# Patient Record
Sex: FEMALE | Race: OTHER | HISPANIC OR LATINO
[De-identification: names, ages, dates, MRNs, and addresses within clinical notes are randomized per-mention and may not be internally consistent; named-entity substitution may affect disease eponyms.]

---

## 2018-07-31 ENCOUNTER — APPOINTMENT (OUTPATIENT)
Dept: HUMAN REPRODUCTION | Facility: CLINIC | Age: 28
End: 2018-07-31
Payer: SELF-PAY

## 2018-07-31 DIAGNOSIS — N91.2 AMENORRHEA, UNSPECIFIED: ICD-10-CM

## 2018-07-31 PROCEDURE — 99212 OFFICE O/P EST SF 10 MIN: CPT

## 2018-08-21 ENCOUNTER — APPOINTMENT (OUTPATIENT)
Dept: HUMAN REPRODUCTION | Facility: CLINIC | Age: 28
End: 2018-08-21
Payer: SELF-PAY

## 2018-08-21 PROCEDURE — 99213 OFFICE O/P EST LOW 20 MIN: CPT | Mod: 25

## 2018-08-21 PROCEDURE — 76830 TRANSVAGINAL US NON-OB: CPT

## 2018-08-21 PROCEDURE — 36415 COLL VENOUS BLD VENIPUNCTURE: CPT

## 2018-09-01 ENCOUNTER — APPOINTMENT (OUTPATIENT)
Dept: HUMAN REPRODUCTION | Facility: CLINIC | Age: 28
End: 2018-09-01
Payer: SELF-PAY

## 2018-09-01 PROCEDURE — 36415 COLL VENOUS BLD VENIPUNCTURE: CPT

## 2018-09-01 PROCEDURE — 99211 OFF/OP EST MAY X REQ PHY/QHP: CPT | Mod: 25

## 2018-09-01 PROCEDURE — 76830 TRANSVAGINAL US NON-OB: CPT

## 2018-09-05 ENCOUNTER — APPOINTMENT (OUTPATIENT)
Dept: HUMAN REPRODUCTION | Facility: CLINIC | Age: 28
End: 2018-09-05
Payer: SELF-PAY

## 2018-09-05 PROCEDURE — 76830 TRANSVAGINAL US NON-OB: CPT

## 2018-09-05 PROCEDURE — 99212 OFFICE O/P EST SF 10 MIN: CPT | Mod: 25

## 2018-09-05 PROCEDURE — 36415 COLL VENOUS BLD VENIPUNCTURE: CPT

## 2018-09-11 ENCOUNTER — APPOINTMENT (OUTPATIENT)
Dept: HUMAN REPRODUCTION | Facility: CLINIC | Age: 28
End: 2018-09-11
Payer: SELF-PAY

## 2018-09-11 PROCEDURE — 76830 TRANSVAGINAL US NON-OB: CPT

## 2018-09-11 PROCEDURE — 99213 OFFICE O/P EST LOW 20 MIN: CPT | Mod: 25

## 2018-09-15 ENCOUNTER — APPOINTMENT (OUTPATIENT)
Dept: HUMAN REPRODUCTION | Facility: CLINIC | Age: 28
End: 2018-09-15
Payer: SELF-PAY

## 2018-09-15 PROCEDURE — 99211 OFF/OP EST MAY X REQ PHY/QHP: CPT | Mod: 25

## 2018-09-15 PROCEDURE — 76830 TRANSVAGINAL US NON-OB: CPT

## 2018-09-20 ENCOUNTER — APPOINTMENT (OUTPATIENT)
Dept: HUMAN REPRODUCTION | Facility: CLINIC | Age: 28
End: 2018-09-20
Payer: SELF-PAY

## 2018-09-20 PROCEDURE — 76830 TRANSVAGINAL US NON-OB: CPT

## 2018-09-20 PROCEDURE — 99213 OFFICE O/P EST LOW 20 MIN: CPT | Mod: 25

## 2018-09-20 RX ORDER — MEDROXYPROGESTERONE ACETATE 10 MG/1
10 TABLET ORAL
Qty: 5 | Refills: 0 | Status: ACTIVE | COMMUNITY
Start: 2018-07-31 | End: 1900-01-01

## 2018-10-05 ENCOUNTER — APPOINTMENT (OUTPATIENT)
Dept: HUMAN REPRODUCTION | Facility: CLINIC | Age: 28
End: 2018-10-05
Payer: SELF-PAY

## 2018-10-05 PROCEDURE — 76830 TRANSVAGINAL US NON-OB: CPT

## 2018-10-05 PROCEDURE — 99213 OFFICE O/P EST LOW 20 MIN: CPT | Mod: 25

## 2018-10-05 RX ORDER — LETROZOLE TABLETS 2.5 MG/1
2.5 TABLET, FILM COATED ORAL
Qty: 15 | Refills: 0 | Status: ACTIVE | COMMUNITY
Start: 2018-10-05 | End: 1900-01-01

## 2018-10-16 ENCOUNTER — APPOINTMENT (OUTPATIENT)
Dept: HUMAN REPRODUCTION | Facility: CLINIC | Age: 28
End: 2018-10-16

## 2023-06-26 ENCOUNTER — OUTPATIENT (OUTPATIENT)
Dept: INPATIENT UNIT | Facility: HOSPITAL | Age: 33
LOS: 1 days | End: 2023-06-26
Payer: COMMERCIAL

## 2023-06-26 VITALS
RESPIRATION RATE: 16 BRPM | HEART RATE: 83 BPM | TEMPERATURE: 98 F | DIASTOLIC BLOOD PRESSURE: 73 MMHG | SYSTOLIC BLOOD PRESSURE: 119 MMHG

## 2023-06-26 VITALS — HEART RATE: 81 BPM | SYSTOLIC BLOOD PRESSURE: 102 MMHG | DIASTOLIC BLOOD PRESSURE: 58 MMHG

## 2023-06-26 DIAGNOSIS — Z98.891 HISTORY OF UTERINE SCAR FROM PREVIOUS SURGERY: Chronic | ICD-10-CM

## 2023-06-26 DIAGNOSIS — O26.899 OTHER SPECIFIED PREGNANCY RELATED CONDITIONS, UNSPECIFIED TRIMESTER: ICD-10-CM

## 2023-06-26 DIAGNOSIS — Z98.890 OTHER SPECIFIED POSTPROCEDURAL STATES: Chronic | ICD-10-CM

## 2023-06-26 PROCEDURE — 76818 FETAL BIOPHYS PROFILE W/NST: CPT

## 2023-06-26 PROCEDURE — 99221 1ST HOSP IP/OBS SF/LOW 40: CPT | Mod: 25,GC

## 2023-06-26 PROCEDURE — 59025 FETAL NON-STRESS TEST: CPT

## 2023-06-26 PROCEDURE — G0463: CPT

## 2023-06-26 PROCEDURE — 59025 FETAL NON-STRESS TEST: CPT | Mod: 26

## 2023-06-26 RX ORDER — ACETAMINOPHEN 500 MG
975 TABLET ORAL ONCE
Refills: 0 | Status: COMPLETED | OUTPATIENT
Start: 2023-06-26 | End: 2023-06-26

## 2023-06-26 RX ADMIN — Medication 975 MILLIGRAM(S): at 19:32

## 2023-06-26 NOTE — OB PROVIDER TRIAGE NOTE - ATTENDING COMMENTS
33y  at 34w5d who presents to L&D for decreased fetal movement since yesterday now resolved, headache responsive to Tylenol with no s/s pre-eclampsia. GDM controlled. Movement counts discussed, return precautions.  Impression NST: reactive

## 2023-06-26 NOTE — OB PROVIDER TRIAGE NOTE - NSOBPROVIDERNOTE_OBGYN_ALL_OB_FT
A/P: 32 yo  at 34w5d who presents for decreased fetal movement, now appreciating movement, and headache s/p Tylenol in triage    -[place]: none  -NST: reactive and reassuring  -BPP : cephalic, posterior placenta, ALIDA 8.8, gross fetal movement tone and breathing noted  -Discussed lying left lateral, drinking cold/fizzy drink to appreciate fetal movement. Discussed  labor return precautions    Discussed with and seen by Dr. Chaves A/P: 32 yo  at 34w5d who presents for decreased fetal movement, now appreciating movement, and headache s/p Tylenol in triage    -[place]: none  -NST: reactive and reassuring  -BPP : cephalic, posterior placenta, ALIDA 8.8, gross fetal movement tone and breathing noted. Uploaded to [facility]OB  -Discussed lying left lateral, drinking cold/fizzy drink to appreciate fetal movement. Discussed  labor return precautions    Discussed with and seen by Dr. Chaves

## 2023-06-26 NOTE — OB RN TRIAGE NOTE - NS_GESTAGE_OBGYN_ALL_OB_FT
Patient arrives via wheelchair after taking the bus to the ED for c/o dyspnea, productive cough, and subjective fever at home for the past 15 hours.  
34w5d

## 2023-06-26 NOTE — OB PROVIDER TRIAGE NOTE - NSHPPHYSICALEXAM_GEN_ALL_CORE
T(C): 36.7 (06-26-23 @ 18:00), Max: 36.7 (06-26-23 @ 17:51)  HR: 83 (06-26-23 @ 18:00) (83 - 83)  BP: 119/73 (06-26-23 @ 18:00) (119/73 - 119/73)  RR: 16 (06-26-23 @ 18:00) (16 - 16)  SpO2: --  Gen: NAD, well-appearing  Pulm: Resting comfortably on room air  Abd: Soft, gravid  Ext: Non-edematous, non-tender       FHT: 140/mod jesus/+accels/-decels  [employer]: none

## 2023-06-26 NOTE — OB PROVIDER TRIAGE NOTE - HISTORY OF PRESENT ILLNESS
RAQUEL REYESLOPEZ is a 33y  at 34w5d who presents to L&D for decreased fetal movement since yesterday. She is now appreciating fm in triage while on the monitors. She reports mild headache that she has not taken anything for. She had nausea, vomiting yesterday that has resolved.     She denies vaginal bleeding, contractions and leakage of fluid.   She denies visual disturbances, RUQ pain, epigastric pain and new-onset edema.   She denies dysuria, hematuria. She denies shortness of breath, chest pain, and palpitations.    Pregnancy course is significant for:  GDMA1    POB: G1 (2017) pCS. G2 () rCS.  PGYN: Denies fibroids, denies cysts, denies STD hx, denies abnormal PAPs  PMH: Denies  PSH: C/Sx2  SH: Denies tobacco use, EtOH use and illicit drug use during the pregnancy. Feels safe at home  Meds: PNV  All: NKDA

## 2023-06-26 NOTE — OB RN TRIAGE NOTE - FALL HARM RISK - UNIVERSAL INTERVENTIONS
Bed in lowest position, wheels locked, appropriate side rails in place/Call bell, personal items and telephone in reach/Instruct patient to call for assistance before getting out of bed or chair/Non-slip footwear when patient is out of bed/[place] to call system/Physically safe environment - no spills, clutter or unnecessary equipment/Purposeful Proactive Rounding/Room/bathroom lighting operational, light cord in reach

## 2023-06-27 DIAGNOSIS — O34.219 MATERNAL CARE FOR UNSPECIFIED TYPE SCAR FROM PREVIOUS CESAREAN DELIVERY: ICD-10-CM

## 2023-06-27 DIAGNOSIS — O36.8130 DECREASED FETAL MOVEMENTS, THIRD TRIMESTER, NOT APPLICABLE OR UNSPECIFIED: ICD-10-CM

## 2023-06-27 DIAGNOSIS — O21.2 LATE VOMITING OF PREGNANCY: ICD-10-CM

## 2023-06-27 DIAGNOSIS — R51.9 HEADACHE, UNSPECIFIED: ICD-10-CM

## 2023-06-27 DIAGNOSIS — O24.410 GESTATIONAL DIABETES MELLITUS IN PREGNANCY, DIET CONTROLLED: ICD-10-CM

## 2023-06-27 DIAGNOSIS — O26.893 OTHER SPECIFIED PREGNANCY RELATED CONDITIONS, THIRD TRIMESTER: ICD-10-CM

## 2023-06-27 DIAGNOSIS — Z3A.34 34 WEEKS GESTATION OF PREGNANCY: ICD-10-CM

## 2023-07-12 PROBLEM — O24.410 GESTATIONAL DIABETES MELLITUS IN PREGNANCY, DIET CONTROLLED: Chronic | Status: ACTIVE | Noted: 2023-06-26

## 2023-07-14 DIAGNOSIS — Z3A.37 37 WEEKS GESTATION OF PREGNANCY: ICD-10-CM

## 2023-07-14 DIAGNOSIS — O24.419 GESTATIONAL DIABETES MELLITUS IN PREGNANCY, UNSPECIFIED CONTROL: ICD-10-CM

## 2023-07-14 DIAGNOSIS — E66.9 OBESITY, UNSPECIFIED: ICD-10-CM

## 2023-07-14 DIAGNOSIS — B89 UNSPECIFIED PARASITIC DISEASE: ICD-10-CM

## 2023-07-17 ENCOUNTER — APPOINTMENT (OUTPATIENT)
Dept: ANTEPARTUM | Facility: CLINIC | Age: 33
End: 2023-07-17

## 2023-07-17 ENCOUNTER — APPOINTMENT (OUTPATIENT)
Dept: MATERNAL FETAL MEDICINE | Facility: CLINIC | Age: 33
End: 2023-07-17

## 2023-07-23 RX ORDER — SODIUM CHLORIDE 9 MG/ML
1000 INJECTION, SOLUTION INTRAVENOUS
Refills: 0 | Status: DISCONTINUED | OUTPATIENT
Start: 2023-07-26 | End: 2023-07-26

## 2023-07-23 NOTE — OB PROVIDER H&P - NSHPPHYSICALEXAM_GEN_ALL_CORE
OBJECTIVE:  Physical Exam:  Gen: NAD, well-appearing, AAOx3   Abd: Soft, gravid  Ext: non-tender, non-edematous  SSE:   SVE:    Bedside sono:  FHT:  [place]: OBJECTIVE:  Physical Exam:  Gen: NAD, well-appearing, AAOx3   Abd: Soft, gravid  Ext: non-tender, non-edematous    Bedside sono:  FHT:  [place]:

## 2023-07-23 NOTE — OB PROVIDER H&P - HISTORY OF PRESENT ILLNESS
Patient is a 33 year old  at 39w0d by US who presents to L&D for rCS and BTL.     ENEDINA: 23   LMP: 22 (not consistent with US)     Pregnancy course pertinent for:   Hx CSx2   GDMA1   T. Cruzi positive   GBS: Negative ()     Obhx:     pCS  8lbs. 8oz.   2020 CS   Gynhx: PCOS Hx fallopian tube surgery, denies fibroids, ovarian cysts, abnormal paps   Pmhx: Latent TB infection   Pshx: CSx2, fallopian tube surgery (unspecified)   Meds: PNV   Allergies: NKDA   Social Hx: Denies using cigarettes, EtOH, illicut drug use. Feels safe at home

## 2023-07-23 NOTE — OB PROVIDER H&P - ASSESSMENT
A/P: RAQUEL REYESLOPEZ is a 33y  at 39wk0d here for rCS and BTL.     - Admit to L&D  - Consent  - Admission labs ordered  - IV fluids  - Fetus: Cat I tracing. Continuous toco and fetal monitoring.   - GBS: Negative    Discussed with   A/P: RAQUEL REYESLOPEZ is a 33y  at 39wk0d here for rCS and BTL.     - Admit to L&D for rCS  - Consent  - Admission labs ordered  - IV fluids  - Fetus: Cat I tracing. Continuous toco and fetal monitoring.   - GBS: Negative    Discussed with Dr. Klein

## 2023-07-25 ENCOUNTER — TRANSCRIPTION ENCOUNTER (OUTPATIENT)
Age: 33
End: 2023-07-25

## 2023-07-25 ENCOUNTER — OUTPATIENT (OUTPATIENT)
Dept: OUTPATIENT SERVICES | Facility: HOSPITAL | Age: 33
LOS: 1 days | End: 2023-07-25
Payer: COMMERCIAL

## 2023-07-25 DIAGNOSIS — Z01.812 ENCOUNTER FOR PREPROCEDURAL LABORATORY EXAMINATION: ICD-10-CM

## 2023-07-25 DIAGNOSIS — Z98.890 OTHER SPECIFIED POSTPROCEDURAL STATES: Chronic | ICD-10-CM

## 2023-07-25 DIAGNOSIS — Z98.891 HISTORY OF UTERINE SCAR FROM PREVIOUS SURGERY: Chronic | ICD-10-CM

## 2023-07-25 LAB
BASOPHILS # BLD AUTO: 0.02 K/UL — SIGNIFICANT CHANGE UP (ref 0–0.2)
BASOPHILS NFR BLD AUTO: 0.2 % — SIGNIFICANT CHANGE UP (ref 0–2)
BLD GP AB SCN SERPL QL: SIGNIFICANT CHANGE UP
COMMENT - BLOOD BANK: SIGNIFICANT CHANGE UP
EOSINOPHIL # BLD AUTO: 0.03 K/UL — SIGNIFICANT CHANGE UP (ref 0–0.5)
EOSINOPHIL NFR BLD AUTO: 0.3 % — SIGNIFICANT CHANGE UP (ref 0–6)
HCT VFR BLD CALC: 31.7 % — LOW (ref 34.5–45)
HGB BLD-MCNC: 10.1 G/DL — LOW (ref 11.5–15.5)
IMM GRANULOCYTES NFR BLD AUTO: 0.3 % — SIGNIFICANT CHANGE UP (ref 0–0.9)
LYMPHOCYTES # BLD AUTO: 1.25 K/UL — SIGNIFICANT CHANGE UP (ref 1–3.3)
LYMPHOCYTES # BLD AUTO: 14.3 % — SIGNIFICANT CHANGE UP (ref 13–44)
MCHC RBC-ENTMCNC: 25.1 PG — LOW (ref 27–34)
MCHC RBC-ENTMCNC: 31.9 GM/DL — LOW (ref 32–36)
MCV RBC AUTO: 78.7 FL — LOW (ref 80–100)
MONOCYTES # BLD AUTO: 0.42 K/UL — SIGNIFICANT CHANGE UP (ref 0–0.9)
MONOCYTES NFR BLD AUTO: 4.8 % — SIGNIFICANT CHANGE UP (ref 2–14)
NEUTROPHILS # BLD AUTO: 6.99 K/UL — SIGNIFICANT CHANGE UP (ref 1.8–7.4)
NEUTROPHILS NFR BLD AUTO: 80.1 % — HIGH (ref 43–77)
PLATELET # BLD AUTO: 209 K/UL — SIGNIFICANT CHANGE UP (ref 150–400)
RBC # BLD: 4.03 M/UL — SIGNIFICANT CHANGE UP (ref 3.8–5.2)
RBC # FLD: 16.1 % — HIGH (ref 10.3–14.5)
WBC # BLD: 8.74 K/UL — SIGNIFICANT CHANGE UP (ref 3.8–10.5)
WBC # FLD AUTO: 8.74 K/UL — SIGNIFICANT CHANGE UP (ref 3.8–10.5)

## 2023-07-26 ENCOUNTER — INPATIENT (INPATIENT)
Facility: HOSPITAL | Age: 33
LOS: 1 days | Discharge: HOME | End: 2023-07-28
Attending: OBSTETRICS & GYNECOLOGY | Admitting: OBSTETRICS & GYNECOLOGY
Payer: COMMERCIAL

## 2023-07-26 ENCOUNTER — TRANSCRIPTION ENCOUNTER (OUTPATIENT)
Age: 33
End: 2023-07-26

## 2023-07-26 VITALS
HEIGHT: 59 IN | WEIGHT: 192.02 LBS | SYSTOLIC BLOOD PRESSURE: 109 MMHG | HEART RATE: 84 BPM | DIASTOLIC BLOOD PRESSURE: 63 MMHG | TEMPERATURE: 98 F | RESPIRATION RATE: 18 BRPM

## 2023-07-26 DIAGNOSIS — Z98.890 OTHER SPECIFIED POSTPROCEDURAL STATES: Chronic | ICD-10-CM

## 2023-07-26 DIAGNOSIS — O34.211 MATERNAL CARE FOR LOW TRANSVERSE SCAR FROM PREVIOUS CESAREAN DELIVERY: ICD-10-CM

## 2023-07-26 DIAGNOSIS — Z98.891 HISTORY OF UTERINE SCAR FROM PREVIOUS SURGERY: Chronic | ICD-10-CM

## 2023-07-26 LAB
ALBUMIN SERPL ELPH-MCNC: 3.6 G/DL — SIGNIFICANT CHANGE UP (ref 3.3–5.2)
ALP SERPL-CCNC: 152 U/L — HIGH (ref 40–120)
ALT FLD-CCNC: 8 U/L — SIGNIFICANT CHANGE UP
ANION GAP SERPL CALC-SCNC: 14 MMOL/L — SIGNIFICANT CHANGE UP (ref 5–17)
ANISOCYTOSIS BLD QL: SLIGHT — SIGNIFICANT CHANGE UP
APPEARANCE UR: ABNORMAL
APTT BLD: 24.4 SEC — LOW (ref 24.5–35.6)
AST SERPL-CCNC: 14 U/L — SIGNIFICANT CHANGE UP
BACTERIA # UR AUTO: ABNORMAL
BASOPHILS # BLD AUTO: 0 K/UL — SIGNIFICANT CHANGE UP (ref 0–0.2)
BASOPHILS # BLD AUTO: 0.02 K/UL — SIGNIFICANT CHANGE UP (ref 0–0.2)
BASOPHILS NFR BLD AUTO: 0 % — SIGNIFICANT CHANGE UP (ref 0–2)
BASOPHILS NFR BLD AUTO: 0.3 % — SIGNIFICANT CHANGE UP (ref 0–2)
BILIRUB SERPL-MCNC: 0.4 MG/DL — SIGNIFICANT CHANGE UP (ref 0.4–2)
BILIRUB UR-MCNC: NEGATIVE — SIGNIFICANT CHANGE UP
BUN SERPL-MCNC: 4.8 MG/DL — LOW (ref 8–20)
CALCIUM SERPL-MCNC: 8.7 MG/DL — SIGNIFICANT CHANGE UP (ref 8.4–10.5)
CHLORIDE SERPL-SCNC: 103 MMOL/L — SIGNIFICANT CHANGE UP (ref 96–108)
CO2 SERPL-SCNC: 21 MMOL/L — LOW (ref 22–29)
COLOR SPEC: YELLOW — SIGNIFICANT CHANGE UP
COVID-19 SPIKE DOMAIN AB INTERP: POSITIVE
COVID-19 SPIKE DOMAIN ANTIBODY RESULT: >250 U/ML — HIGH
CREAT ?TM UR-MCNC: 38 MG/DL — SIGNIFICANT CHANGE UP
CREAT SERPL-MCNC: 0.24 MG/DL — LOW (ref 0.5–1.3)
DIFF PNL FLD: NEGATIVE — SIGNIFICANT CHANGE UP
EGFR: 151 ML/MIN/1.73M2 — SIGNIFICANT CHANGE UP
EOSINOPHIL # BLD AUTO: 0 K/UL — SIGNIFICANT CHANGE UP (ref 0–0.5)
EOSINOPHIL # BLD AUTO: 0.04 K/UL — SIGNIFICANT CHANGE UP (ref 0–0.5)
EOSINOPHIL NFR BLD AUTO: 0 % — SIGNIFICANT CHANGE UP (ref 0–6)
EOSINOPHIL NFR BLD AUTO: 0.5 % — SIGNIFICANT CHANGE UP (ref 0–6)
EPI CELLS # UR: ABNORMAL
FIBRINOGEN PPP-MCNC: 419 MG/DL — SIGNIFICANT CHANGE UP (ref 200–450)
GIANT PLATELETS BLD QL SMEAR: PRESENT — SIGNIFICANT CHANGE UP
GLUCOSE BLDC GLUCOMTR-MCNC: 79 MG/DL — SIGNIFICANT CHANGE UP (ref 70–99)
GLUCOSE BLDC GLUCOMTR-MCNC: 93 MG/DL — SIGNIFICANT CHANGE UP (ref 70–99)
GLUCOSE SERPL-MCNC: 73 MG/DL — SIGNIFICANT CHANGE UP (ref 70–99)
GLUCOSE UR QL: NEGATIVE MG/DL — SIGNIFICANT CHANGE UP
HCT VFR BLD CALC: 30 % — LOW (ref 34.5–45)
HCT VFR BLD CALC: 32.2 % — LOW (ref 34.5–45)
HGB BLD-MCNC: 10.5 G/DL — LOW (ref 11.5–15.5)
HGB BLD-MCNC: 9.7 G/DL — LOW (ref 11.5–15.5)
IMM GRANULOCYTES NFR BLD AUTO: 0.3 % — SIGNIFICANT CHANGE UP (ref 0–0.9)
INR BLD: 0.98 RATIO — SIGNIFICANT CHANGE UP (ref 0.85–1.18)
KETONES UR-MCNC: ABNORMAL
LDH SERPL L TO P-CCNC: 157 U/L — SIGNIFICANT CHANGE UP (ref 98–192)
LEUKOCYTE ESTERASE UR-ACNC: ABNORMAL
LYMPHOCYTES # BLD AUTO: 0.63 K/UL — LOW (ref 1–3.3)
LYMPHOCYTES # BLD AUTO: 1.12 K/UL — SIGNIFICANT CHANGE UP (ref 1–3.3)
LYMPHOCYTES # BLD AUTO: 14.5 % — SIGNIFICANT CHANGE UP (ref 13–44)
LYMPHOCYTES # BLD AUTO: 3.4 % — LOW (ref 13–44)
MANUAL SMEAR VERIFICATION: SIGNIFICANT CHANGE UP
MCHC RBC-ENTMCNC: 25.1 PG — LOW (ref 27–34)
MCHC RBC-ENTMCNC: 25.3 PG — LOW (ref 27–34)
MCHC RBC-ENTMCNC: 32.3 GM/DL — SIGNIFICANT CHANGE UP (ref 32–36)
MCHC RBC-ENTMCNC: 32.6 GM/DL — SIGNIFICANT CHANGE UP (ref 32–36)
MCV RBC AUTO: 77.5 FL — LOW (ref 80–100)
MCV RBC AUTO: 77.6 FL — LOW (ref 80–100)
MICROCYTES BLD QL: SLIGHT — SIGNIFICANT CHANGE UP
MONOCYTES # BLD AUTO: 0.17 K/UL — SIGNIFICANT CHANGE UP (ref 0–0.9)
MONOCYTES # BLD AUTO: 0.49 K/UL — SIGNIFICANT CHANGE UP (ref 0–0.9)
MONOCYTES NFR BLD AUTO: 0.9 % — LOW (ref 2–14)
MONOCYTES NFR BLD AUTO: 6.4 % — SIGNIFICANT CHANGE UP (ref 2–14)
NEUTROPHILS # BLD AUTO: 17.71 K/UL — HIGH (ref 1.8–7.4)
NEUTROPHILS # BLD AUTO: 6.02 K/UL — SIGNIFICANT CHANGE UP (ref 1.8–7.4)
NEUTROPHILS NFR BLD AUTO: 78 % — HIGH (ref 43–77)
NEUTROPHILS NFR BLD AUTO: 95.7 % — HIGH (ref 43–77)
NITRITE UR-MCNC: NEGATIVE — SIGNIFICANT CHANGE UP
PH UR: 7 — SIGNIFICANT CHANGE UP (ref 5–8)
PLAT MORPH BLD: NORMAL — SIGNIFICANT CHANGE UP
PLATELET # BLD AUTO: 186 K/UL — SIGNIFICANT CHANGE UP (ref 150–400)
PLATELET # BLD AUTO: 207 K/UL — SIGNIFICANT CHANGE UP (ref 150–400)
POTASSIUM SERPL-MCNC: 3.6 MMOL/L — SIGNIFICANT CHANGE UP (ref 3.5–5.3)
POTASSIUM SERPL-SCNC: 3.6 MMOL/L — SIGNIFICANT CHANGE UP (ref 3.5–5.3)
PROT ?TM UR-MCNC: 16 MG/DL — HIGH (ref 0–12)
PROT ?TM UR-MCNC: 16 MG/DL — HIGH (ref 0–12)
PROT SERPL-MCNC: 6.5 G/DL — LOW (ref 6.6–8.7)
PROT UR-MCNC: 15
PROT/CREAT UR-RTO: 0.4 RATIO — HIGH
PROTHROM AB SERPL-ACNC: 10.9 SEC — SIGNIFICANT CHANGE UP (ref 9.5–13)
RBC # BLD: 3.87 M/UL — SIGNIFICANT CHANGE UP (ref 3.8–5.2)
RBC # BLD: 4.15 M/UL — SIGNIFICANT CHANGE UP (ref 3.8–5.2)
RBC # FLD: 15.9 % — HIGH (ref 10.3–14.5)
RBC # FLD: 16.4 % — HIGH (ref 10.3–14.5)
RBC BLD AUTO: ABNORMAL
RBC CASTS # UR COMP ASSIST: ABNORMAL /HPF (ref 0–4)
SARS-COV-2 IGG+IGM SERPL QL IA: >250 U/ML — HIGH
SARS-COV-2 IGG+IGM SERPL QL IA: POSITIVE
SODIUM SERPL-SCNC: 138 MMOL/L — SIGNIFICANT CHANGE UP (ref 135–145)
SP GR SPEC: 1.01 — SIGNIFICANT CHANGE UP (ref 1.01–1.02)
T PALLIDUM AB TITR SER: NEGATIVE — SIGNIFICANT CHANGE UP
URATE SERPL-MCNC: 4 MG/DL — SIGNIFICANT CHANGE UP (ref 2.4–5.7)
UROBILINOGEN FLD QL: NEGATIVE MG/DL — SIGNIFICANT CHANGE UP
WBC # BLD: 18.51 K/UL — HIGH (ref 3.8–10.5)
WBC # BLD: 7.71 K/UL — SIGNIFICANT CHANGE UP (ref 3.8–10.5)
WBC # FLD AUTO: 18.51 K/UL — HIGH (ref 3.8–10.5)
WBC # FLD AUTO: 7.71 K/UL — SIGNIFICANT CHANGE UP (ref 3.8–10.5)
WBC UR QL: ABNORMAL /HPF (ref 0–5)

## 2023-07-26 PROCEDURE — 86901 BLOOD TYPING SEROLOGIC RH(D): CPT

## 2023-07-26 PROCEDURE — 88302 TISSUE EXAM BY PATHOLOGIST: CPT | Mod: 26

## 2023-07-26 PROCEDURE — 86923 COMPATIBILITY TEST ELECTRIC: CPT

## 2023-07-26 PROCEDURE — 36415 COLL VENOUS BLD VENIPUNCTURE: CPT

## 2023-07-26 PROCEDURE — 86900 BLOOD TYPING SEROLOGIC ABO: CPT

## 2023-07-26 PROCEDURE — 85025 COMPLETE CBC W/AUTO DIFF WBC: CPT

## 2023-07-26 PROCEDURE — 86850 RBC ANTIBODY SCREEN: CPT

## 2023-07-26 DEVICE — ARISTA 1GR: Type: IMPLANTABLE DEVICE | Status: FUNCTIONAL

## 2023-07-26 DEVICE — SURGICEL FIBRILLAR 2 X 4": Type: IMPLANTABLE DEVICE | Status: FUNCTIONAL

## 2023-07-26 RX ORDER — MAGNESIUM HYDROXIDE 400 MG/1
30 TABLET, CHEWABLE ORAL
Refills: 0 | Status: DISCONTINUED | OUTPATIENT
Start: 2023-07-26 | End: 2023-07-28

## 2023-07-26 RX ORDER — TETANUS TOXOID, REDUCED DIPHTHERIA TOXOID AND ACELLULAR PERTUSSIS VACCINE, ADSORBED 5; 2.5; 8; 8; 2.5 [IU]/.5ML; [IU]/.5ML; UG/.5ML; UG/.5ML; UG/.5ML
0.5 SUSPENSION INTRAMUSCULAR ONCE
Refills: 0 | Status: DISCONTINUED | OUTPATIENT
Start: 2023-07-26 | End: 2023-07-28

## 2023-07-26 RX ORDER — DIPHENHYDRAMINE HCL 50 MG
25 CAPSULE ORAL EVERY 6 HOURS
Refills: 0 | Status: DISCONTINUED | OUTPATIENT
Start: 2023-07-26 | End: 2023-07-28

## 2023-07-26 RX ORDER — SCOPALAMINE 1 MG/3D
1 PATCH, EXTENDED RELEASE TRANSDERMAL ONCE
Refills: 0 | Status: COMPLETED | OUTPATIENT
Start: 2023-07-26 | End: 2023-07-26

## 2023-07-26 RX ORDER — NALOXONE HYDROCHLORIDE 4 MG/.1ML
0.1 SPRAY NASAL
Refills: 0 | Status: DISCONTINUED | OUTPATIENT
Start: 2023-07-26 | End: 2023-07-28

## 2023-07-26 RX ORDER — KETOROLAC TROMETHAMINE 30 MG/ML
30 SYRINGE (ML) INJECTION EVERY 6 HOURS
Refills: 0 | Status: DISCONTINUED | OUTPATIENT
Start: 2023-07-26 | End: 2023-07-28

## 2023-07-26 RX ORDER — NALBUPHINE HYDROCHLORIDE 10 MG/ML
2.5 INJECTION, SOLUTION INTRAMUSCULAR; INTRAVENOUS; SUBCUTANEOUS EVERY 6 HOURS
Refills: 0 | Status: DISCONTINUED | OUTPATIENT
Start: 2023-07-26 | End: 2023-07-28

## 2023-07-26 RX ORDER — SIMETHICONE 80 MG/1
80 TABLET, CHEWABLE ORAL EVERY 4 HOURS
Refills: 0 | Status: DISCONTINUED | OUTPATIENT
Start: 2023-07-26 | End: 2023-07-28

## 2023-07-26 RX ORDER — CEFAZOLIN SODIUM 1 G
2000 VIAL (EA) INJECTION ONCE
Refills: 0 | Status: COMPLETED | OUTPATIENT
Start: 2023-07-26 | End: 2023-07-26

## 2023-07-26 RX ORDER — TRANEXAMIC ACID 100 MG/ML
1000 INJECTION, SOLUTION INTRAVENOUS ONCE
Refills: 0 | Status: COMPLETED | OUTPATIENT
Start: 2023-07-26 | End: 2023-07-26

## 2023-07-26 RX ORDER — ACETAMINOPHEN 500 MG
975 TABLET ORAL
Refills: 0 | Status: DISCONTINUED | OUTPATIENT
Start: 2023-07-26 | End: 2023-07-28

## 2023-07-26 RX ORDER — LANOLIN
1 OINTMENT (GRAM) TOPICAL EVERY 6 HOURS
Refills: 0 | Status: DISCONTINUED | OUTPATIENT
Start: 2023-07-26 | End: 2023-07-28

## 2023-07-26 RX ORDER — IBUPROFEN 200 MG
600 TABLET ORAL EVERY 6 HOURS
Refills: 0 | Status: COMPLETED | OUTPATIENT
Start: 2023-07-26 | End: 2024-06-23

## 2023-07-26 RX ORDER — ACETAMINOPHEN 500 MG
975 TABLET ORAL ONCE
Refills: 0 | Status: COMPLETED | OUTPATIENT
Start: 2023-07-26 | End: 2023-07-26

## 2023-07-26 RX ORDER — CITRIC ACID/SODIUM CITRATE 300-500 MG
30 SOLUTION, ORAL ORAL ONCE
Refills: 0 | Status: COMPLETED | OUTPATIENT
Start: 2023-07-26 | End: 2023-07-26

## 2023-07-26 RX ORDER — OXYCODONE HYDROCHLORIDE 5 MG/1
5 TABLET ORAL
Refills: 0 | Status: DISCONTINUED | OUTPATIENT
Start: 2023-07-26 | End: 2023-07-26

## 2023-07-26 RX ORDER — SODIUM CHLORIDE 9 MG/ML
1000 INJECTION, SOLUTION INTRAVENOUS ONCE
Refills: 0 | Status: COMPLETED | OUTPATIENT
Start: 2023-07-26 | End: 2023-07-26

## 2023-07-26 RX ORDER — OXYCODONE HYDROCHLORIDE 5 MG/1
5 TABLET ORAL ONCE
Refills: 0 | Status: DISCONTINUED | OUTPATIENT
Start: 2023-07-26 | End: 2023-07-28

## 2023-07-26 RX ORDER — OXYTOCIN 10 UNIT/ML
333.33 VIAL (ML) INJECTION
Qty: 20 | Refills: 0 | Status: DISCONTINUED | OUTPATIENT
Start: 2023-07-26 | End: 2023-07-28

## 2023-07-26 RX ORDER — ONDANSETRON 8 MG/1
4 TABLET, FILM COATED ORAL EVERY 6 HOURS
Refills: 0 | Status: DISCONTINUED | OUTPATIENT
Start: 2023-07-26 | End: 2023-07-28

## 2023-07-26 RX ORDER — FAMOTIDINE 10 MG/ML
20 INJECTION INTRAVENOUS ONCE
Refills: 0 | Status: COMPLETED | OUTPATIENT
Start: 2023-07-26 | End: 2023-07-26

## 2023-07-26 RX ORDER — DEXAMETHASONE 0.5 MG/5ML
4 ELIXIR ORAL EVERY 6 HOURS
Refills: 0 | Status: DISCONTINUED | OUTPATIENT
Start: 2023-07-26 | End: 2023-07-28

## 2023-07-26 RX ORDER — ENOXAPARIN SODIUM 100 MG/ML
40 INJECTION SUBCUTANEOUS EVERY 24 HOURS
Refills: 0 | Status: DISCONTINUED | OUTPATIENT
Start: 2023-07-26 | End: 2023-07-28

## 2023-07-26 RX ORDER — SCOPALAMINE 1 MG/3D
1 PATCH, EXTENDED RELEASE TRANSDERMAL ONCE
Refills: 0 | Status: DISCONTINUED | OUTPATIENT
Start: 2023-07-26 | End: 2023-07-26

## 2023-07-26 RX ORDER — OXYCODONE HYDROCHLORIDE 5 MG/1
5 TABLET ORAL
Refills: 0 | Status: DISCONTINUED | OUTPATIENT
Start: 2023-07-26 | End: 2023-07-28

## 2023-07-26 RX ORDER — SODIUM CHLORIDE 9 MG/ML
1000 INJECTION, SOLUTION INTRAVENOUS
Refills: 0 | Status: DISCONTINUED | OUTPATIENT
Start: 2023-07-26 | End: 2023-07-28

## 2023-07-26 RX ORDER — OXYTOCIN 10 UNIT/ML
333.33 VIAL (ML) INJECTION
Qty: 20 | Refills: 0 | Status: COMPLETED | OUTPATIENT
Start: 2023-07-26 | End: 2023-07-26

## 2023-07-26 RX ORDER — DIPHENHYDRAMINE HCL 50 MG
25 CAPSULE ORAL EVERY 4 HOURS
Refills: 0 | Status: DISCONTINUED | OUTPATIENT
Start: 2023-07-26 | End: 2023-07-28

## 2023-07-26 RX ADMIN — SCOPALAMINE 1 PATCH: 1 PATCH, EXTENDED RELEASE TRANSDERMAL at 09:36

## 2023-07-26 RX ADMIN — ONDANSETRON 4 MILLIGRAM(S): 8 TABLET, FILM COATED ORAL at 17:50

## 2023-07-26 RX ADMIN — ENOXAPARIN SODIUM 40 MILLIGRAM(S): 100 INJECTION SUBCUTANEOUS at 22:13

## 2023-07-26 RX ADMIN — Medication 30 MILLIGRAM(S): at 11:20

## 2023-07-26 RX ADMIN — Medication 30 MILLIGRAM(S): at 17:53

## 2023-07-26 RX ADMIN — Medication 975 MILLIGRAM(S): at 10:47

## 2023-07-26 RX ADMIN — TRANEXAMIC ACID 220 MILLIGRAM(S): 100 INJECTION, SOLUTION INTRAVENOUS at 10:29

## 2023-07-26 RX ADMIN — Medication 30 MILLILITER(S): at 10:17

## 2023-07-26 RX ADMIN — OXYCODONE HYDROCHLORIDE 5 MILLIGRAM(S): 5 TABLET ORAL at 14:59

## 2023-07-26 RX ADMIN — SODIUM CHLORIDE 2000 MILLILITER(S): 9 INJECTION, SOLUTION INTRAVENOUS at 09:15

## 2023-07-26 RX ADMIN — Medication 1000 MILLIUNIT(S)/MIN: at 14:23

## 2023-07-26 RX ADMIN — ONDANSETRON 4 MILLIGRAM(S): 8 TABLET, FILM COATED ORAL at 22:30

## 2023-07-26 RX ADMIN — SODIUM CHLORIDE 125 MILLILITER(S): 9 INJECTION, SOLUTION INTRAVENOUS at 09:50

## 2023-07-26 RX ADMIN — Medication 2000 MILLIGRAM(S): at 10:45

## 2023-07-26 RX ADMIN — OXYCODONE HYDROCHLORIDE 5 MILLIGRAM(S): 5 TABLET ORAL at 14:29

## 2023-07-26 RX ADMIN — FAMOTIDINE 20 MILLIGRAM(S): 10 INJECTION INTRAVENOUS at 10:17

## 2023-07-26 RX ADMIN — SODIUM CHLORIDE 125 MILLILITER(S): 9 INJECTION, SOLUTION INTRAVENOUS at 10:01

## 2023-07-26 RX ADMIN — Medication 30 MILLIGRAM(S): at 23:32

## 2023-07-26 RX ADMIN — Medication 1000 MILLIUNIT(S)/MIN: at 11:16

## 2023-07-26 RX ADMIN — Medication 975 MILLIGRAM(S): at 16:58

## 2023-07-26 RX ADMIN — Medication 30 MILLIGRAM(S): at 11:50

## 2023-07-26 RX ADMIN — Medication 975 MILLIGRAM(S): at 10:17

## 2023-07-26 RX ADMIN — Medication 975 MILLIGRAM(S): at 17:28

## 2023-07-26 RX ADMIN — Medication 30 MILLIGRAM(S): at 18:23

## 2023-07-26 NOTE — OB RN PATIENT PROFILE - VISION (WITH CORRECTIVE LENSES IF THE PATIENT USUALLY WEARS THEM):
Telephone Encounter by Divine Tovar MD at 05/03/17 11:47 AM     Author:  Divine Tovar MD Service:  (none) Author Type:  Physician     Filed:  05/03/17 11:49 AM Encounter Date:  5/2/2017 Status:  Signed     :  Divine Tovar MD (Physician)            Yes there is no room to increase if she is at that dose.  Ok to refill X 1 year.    We have addressed her diarrhea many times already.  She still has flare ups of diarrhea because her ulcerative colitis is not controlled by the balsalazide and she should be on something stronger.  We can't give her anything stronger because of her anal area that is being managed right now.  She has discussed the possibilty of surgery many times with Dr Lee.  Medically, I can't do much more for her at this time with her anal dysplasia being managed.    Thanks  kj[[id number]]      Revision History        User Key Date/Time User Provider Type Action    > KJ1.1 05/03/17 11:49 AM Divine Tovar MD Physician Sign    M - Manual             Normal vision: sees adequately in most situations; can see medication labels, newsprint

## 2023-07-26 NOTE — OB PROVIDER H&P - ASSESSMENT
A/P: RAQUEL REYESLOPEZ is a 33y  at 39wk0d here for rCS and BTL.     - Admit to L&D for rCS  - Confirm clearance for OR given symptoms of fever yday  - Consent  - Admission labs ordered  - IV fluids  - Fetus: Cat I tracing. Continuous toco and fetal monitoring.   - GBS: Negative    Discussed with Dr. Klein

## 2023-07-26 NOTE — OB PROVIDER H&P - ATTENDING COMMENTS
Multip with IUP @ 39wks here  for repeat  section and bilateral salpingectomy. GDMA1, admitted overnight at [facility] for fetal tachy and fever. s/p Rocephin. No further symptoms with RVP panel negative. Pre-e labs neg. Pregnancy also complicated by  anemia on iron.    -admit  -NPO  -Hx of possible PPH, will have two IV's give TXA, have 2 PRBC's on hold and PPH kit in room  -proceed with  section  -routine orders for  section  -consents explained in detail, risks and benefits discussed. All questions and concerns answered in detail  ppalos

## 2023-07-26 NOTE — DISCHARGE NOTE OB - CARE PROVIDER_API CALL
Aretha Klein  Obstetrics and Gynecology  [facility] [place] [address]  Phone: [phone]  Fax: [phone]  Established Patient  Follow Up Time: 1 week

## 2023-07-26 NOTE — OB RN DELIVERY SUMMARY - NS_SEPSISRSKCALC_OBGYN_ALL_OB_FT
No temperature has been documented for this patient in CPN or on the OB Flowsheet. Ensure the highest temperature during labor was documented on the OB Flowsheet.  No gestational age at birth has been documented. Ensure delivery date/time has been entered above.  Rupture of membranes must be entered above.   EOS calculated successfully. EOS Risk Factor: 0.5/1000 live births ([facility] national incidence); GA=39w;Temp=98.5; ROM=0; GBS='Negative'; Antibiotics='No antibiotics or any antibiotics < 2 hrs prior to birth'

## 2023-07-26 NOTE — OB RN PATIENT PROFILE - INTERPRETER'S NAME
----- Message from Martha Vitale sent at 1/23/2017 10:36 AM CST -----  needs callback (will elaborate)..[phone]     Princess

## 2023-07-26 NOTE — DISCHARGE NOTE OB - HOSPITAL COURSE
She is now a  who presented for a repeat  section. Her surgery was complicated by a stable postpartum hemorrhage s/p receiving TXA. Her postpartum course was complicated by symptomatic anemia with a Hgb of 9. The patient received 1 unit of pRBCs.  Upon discharge she was passing gas, tolerating PO, ambulating, and voiding spontaneously.

## 2023-07-26 NOTE — OB NEONATOLOGY/PEDIATRICIAN DELIVERY SUMMARY - NSPEDSNEONOTESA_OBGYN_ALL_OB_FT
Requested by DR Klein to attend a RC/S of a 32y/o  at 39 weeks GA secondary to meconium stained amniotic fluids.  She had + PNC, is blood type O pos, HIV neg, HBsAg neg, RPR NR, Rubella Imm, GBS neg, T2DM (diet controlled), subclinical Hypothyroid, PCOS  L&D:  AROM at delivery with meconium stained amniotic fluids.  Baby born vertex with good cry, DCC x 30sec,  transferred to warmer, orally suctioned, dried by nurse.  Upon my arrival to OR baby was crying, NAD, pink in RA.  Infant examined. Infant showed to father and then to be transferred to mother for STS.  BW:  3210g  A/P:  FT male, IDM, meconium stained amniotic fluid  Transition to Regular Nursery under Peds Hospitalist care.  Monitor glucose as per Guidelines

## 2023-07-26 NOTE — OB RN PATIENT PROFILE - PARENTS VERBALIZED UNDERSTANDING OF THE SAFE SKIN TO SKIN POSITIONING OF THE NEWBORN.
59M with metastatic R-sided RCC on cabzatinib, HTN, T2DM presenting from [facility] for uncontrolled blood pressure attributed to chemotherapy side effect and admitted for further management of hypertensive urgency.
59M with metastatic R-sided RCC on cabzatinib, HTN, T2DM [place] [facility] for uncontrolled blood pressure attributed to chemotherapy side effect and admitted for further management of hypertensive urgency.
Statement Selected

## 2023-07-26 NOTE — OB RN PATIENT PROFILE - NSTRANFUSIONOBJECTION_GEN_ALL_CORE_SIUH
Patient has no objection to blood transfusions.
[FreeTextEntry1] : Physical\par \par 1. Eye lid basal cell carcinoma\par following with surgeon next appt 1/2022\par following with dental next appt 12/30/2021\par \par 2. Hypothyroid\par cont Levothyroxine 175 mcg daily - refilled\par \par 3. HLD\par Not currently on Crestor\par low cholesterol, low fat diet\par will check lipids today \par \par 4. Cerumen impaction\par ENT referral \par \par 5. upper palate infection\par Augmentin 875- 125 mg twice daily for 7 days with food \par Labs ordered pt to f/u 1 week for results

## 2023-07-26 NOTE — DISCHARGE NOTE OB - MATERIALS PROVIDED
Vaccinations/[facility]  Screening Program/  Immunization Record/Breastfeeding Log/Bottle Feeding Log/Breastfeeding Mother’s Support Group Information/Guide to Postpartum Care/[facility] Hearing Screen Program/Back To Sleep Handout/Shaken Baby Prevention Handout/Breastfeeding Guide and Packet/Birth Certificate Instructions/Discharge Medication Information for Patients and Families Pocket Guide

## 2023-07-26 NOTE — OB RN PATIENT PROFILE - FALL HARM RISK - UNIVERSAL INTERVENTIONS
Bed in lowest position, wheels locked, appropriate side rails in place/Call bell, personal items and telephone in reach/Instruct patient to call for assistance before getting out of bed or chair/Non-slip footwear when patient is out of bed/[place] to call system/Physically safe environment - no spills, clutter or unnecessary equipment/Purposeful Proactive Rounding/Room/bathroom lighting operational, light cord in reach

## 2023-07-26 NOTE — OB PROVIDER H&P - HISTORY OF PRESENT ILLNESS
Patient is a 33 year old  at 39w0d by US who presents to L&D for rCS and BTL. Patient reported she had a headache, blurry vision, and partial facial numbness on Monday, she went to [facility] on Tuesday where she was told she had a fever. Pertinent positives include a hx of burning with voiding since Monday. She denies any cough, sneezing, diarrhea, sick contacts.     ENEDINA: 23   LMP: 22 (not consistent with US)     Pregnancy course pertinent for:   Hx CSx2   GDMA1   T. Cruzi positive   Spinal Muscular Atrophy Carrier   GBS: Negative ()     Obhx:    2017 pCS  8lbs. 8oz.   2020 CS   Gynhx: PCOS Hx fallopian tube surgery, denies fibroids, ovarian cysts, abnormal paps   Pmhx: Latent TB infection   Pshx: CSx2, fallopian tube surgery (unspecified)   Meds: PNV, Iron  Allergies: NKDA   Social Hx: Denies using cigarettes, EtOH, illicut drug use. Feels safe at home     Patient is a 33 year old  at 39w0d by US who presents to L&D for rCS and BTL. Patient reported she had a headache, blurry vision, and partial facial numbness on Monday, she went to [facility] on Tuesday where she was told she had a fever. Pertinent positives include a hx of burning with voiding since Monday. She denies any sneezing, diarrhea, sick contacts.     ENEDINA: 23   LMP: 22 (not consistent with US)     Pregnancy course pertinent for:   Hx CSx2   GDMA1   Anemia  T. Cruzi positive   Spinal Muscular Atrophy Carrier   GBS: Negative ()     Obhx:    2017 pCS  8lbs. 8oz.   2020 CS   Gynhx: PCOS Hx fallopian tube surgery, denies fibroids, ovarian cysts, abnormal paps   Pmhx: Latent TB infection   Pshx: CSx2, fallopian tube surgery (unspecified)   Meds: PNV, Iron  Allergies: NKDA   Social Hx: Denies using cigarettes, EtOH, illicut drug use. Feels safe at home

## 2023-07-26 NOTE — OB PROVIDER H&P - NSOBVTERISKASSESS_OBGYN_ALL_OB_FT
OBAntePartum Assessment Completed on: 26-Jul-2023 08:39
OBAntePartum Assessment Completed on: 26-Jul-2023 08:39

## 2023-07-26 NOTE — DISCHARGE NOTE OB - MEDICATION SUMMARY - MEDICATIONS TO STOP TAKING
I will STOP taking the medications listed below when I get home from the hospital:    acetaminophen-oxyCODONE 325 mg-5 mg oral tablet  -- 2 tab(s) by mouth every 3 hours, As needed, Severe Pain

## 2023-07-26 NOTE — OB PROVIDER DELIVERY SUMMARY - NSCSDELIVATYPE_OBGYN_ALL_OB
Abdomen , soft, nontender, nondistended , no guarding or rigidity , no masses palpable , normal bowel sounds , Liver and Spleen , no hepatosplenomegaly present, liver nontender Rectal  deferred Repeat

## 2023-07-26 NOTE — CHART NOTE - NSCHARTNOTEFT_GEN_A_CORE
Pt seen bedside 2/2 RN call.    Reports some dizziness, no shortness of breath.    Uterus firm, no additional bleeding. s/p rCS +BS c/b PPH  Sp02 @ 0.5L satting at 96% will attempt to wean. lungs CTAB    Vital Signs Last 24 Hrs  T(C): 36.7 (07-26-23 @ 12:50), Max: 36.9 (07-26-23 @ 08:49)  T(F): 98.1 (07-26-23 @ 12:50), Max: 98.5 (07-26-23 @ 08:49)  HR: 72 (07-26-23 @ 15:12) (65 - 84)  BP: 92/54 (07-26-23 @ 15:01) (83/52 - 109/63)  BP(mean): --  RR: 16 (07-26-23 @ 14:01) (16 - 18)  SpO2: 93% (07-26-23 @ 15:12) (89% - 98%)    d/w Dr Klein will transfuse 1UPRBC as starting hgb 9.7  post transfusion CBC

## 2023-07-26 NOTE — DISCHARGE NOTE OB - PATIENT PORTAL LINK FT
You can access the FollowMyHealth Patient Portal offered by [facility] by registering at the following website: [email or web address]. By joining [employer]’s FollowMyHealth portal, you will also be able to view your health information using other applications (apps) compatible with our system.

## 2023-07-26 NOTE — OB RN DELIVERY SUMMARY - NSSELHIDDEN_OBGYN_ALL_OB_FT
[NS_DeliveryAttending1_OBGYN_ALL_OB_FT:[email or web address]=],[NS_DeliveryAssist2_OBGYN_ALL_OB_FT:[email or web address]=],[NS_DeliveryRN_OBGYN_ALL_OB_FT:[id number]=],[NS_CirculateRN2_OBGYN_ALL_OB_FT:[id number]=],[NS_DeliveryAssist1_OBGYN_ALL_OB_FT:[id number]=]

## 2023-07-26 NOTE — OB PROVIDER DELIVERY SUMMARY - NSPROVIDERDELIVERYNOTE_OBGYN_ALL_OB_FT
Repeat  section with bilateral salpingectomy and left paratubal cyst removal with lysis of adhesions   Lysis of adhesions of omentum to anterior abdominal wall via liga sure , adhesions also on the FRANKY.  Two layer closure with snow and surgicel fibrillar with figure of eight for hemostasis.  Bilateral salpingectomy via ligasure  peritoneum and abdominal muscles were not reapproximated due to tension  Fascia reapproximated and rest of procedure as per usual.  Mother and infant in room in stable condition.    QBL: 890cc  UOP: 150cc  IVF: 1L  Light mec, Apgar 9/9, male, [id number]  I was present throughout entire procedure  ppalos

## 2023-07-26 NOTE — OB PROVIDER H&P - NSHPPHYSICALEXAM_GEN_ALL_CORE
OBJECTIVE:  Physical Exam:  Gen: NAD, well-appearing, AAOx3   Abd: Soft, gravid  Ext: non-tender, non-edematous  SVE: deferred    Bedside sono:  FHT: 140bpm baseline, moderate variability, +accels, - decels  [place]: No contractions

## 2023-07-26 NOTE — OB RN INTRAOPERATIVE NOTE - NSSELHIDDEN_OBGYN_ALL_OB_FT
[NS_DeliveryAttending1_OBGYN_ALL_OB_FT:[email or web address]=],[NS_DeliveryAssist2_OBGYN_ALL_OB_FT:[email or web address]=],[NS_DeliveryRN_OBGYN_ALL_OB_FT:[id number]=],[NS_CirculateRN2_OBGYN_ALL_OB_FT:[id number]=],[NS_DeliveryAssist1_OBGYN_ALL_OB_FT:[id number]=]

## 2023-07-26 NOTE — DISCHARGE NOTE OB - MEDICATION SUMMARY - MEDICATIONS TO TAKE
I will START or STAY ON the medications listed below when I get home from the hospital:    ibuprofen 600 mg oral tablet  -- 1 tab(s) by mouth every 6 hours as needed for  moderate pain  -- Indication: For pain    acetaminophen 325 mg oral tablet  -- 3 tab(s) by mouth every 6 hours as needed for  mild pain  -- Indication: For pain

## 2023-07-26 NOTE — OB PROVIDER DELIVERY SUMMARY - NSSELHIDDEN_OBGYN_ALL_OB_FT
[NS_DeliveryAttending1_OBGYN_ALL_OB_FT:[id number]=],[NS_DeliveryAssist2_OBGYN_ALL_OB_FT:[email or web address]=],[NS_DeliveryRN_OBGYN_ALL_OB_FT:[id number]=],[NS_CirculateRN2_OBGYN_ALL_OB_FT:[id number]=],[NS_DeliveryAssist1_OBGYN_ALL_OB_FT:[id number]=]

## 2023-07-26 NOTE — OB POSTPARTUM EVENT NOTE - NS_EVENTSUMMARY1_OBGYN_ALL_OB_FT
Clot expressed with fundal massage--cumulative QBL now 1007. Dr. Guido notified. Fundus firm at umbilicus, no further clots or trickle noted. Vital signs reviewed with MD AMY aware pt's O2 saturation 93% on 1L nasal cannula. Previous RN (AKSHAT Hewitt) noted pt's oxygen saturation 89% on room air when pt sleeping. Pt denies dizziness or lightheadedness. No further orders at this time.

## 2023-07-27 LAB
BASOPHILS # BLD AUTO: 0.02 K/UL — SIGNIFICANT CHANGE UP (ref 0–0.2)
BASOPHILS NFR BLD AUTO: 0.2 % — SIGNIFICANT CHANGE UP (ref 0–2)
EOSINOPHIL # BLD AUTO: 0.01 K/UL — SIGNIFICANT CHANGE UP (ref 0–0.5)
EOSINOPHIL NFR BLD AUTO: 0.1 % — SIGNIFICANT CHANGE UP (ref 0–6)
HCT VFR BLD CALC: 28.1 % — LOW (ref 34.5–45)
HCT VFR BLD CALC: 28.2 % — LOW (ref 34.5–45)
HGB BLD-MCNC: 9 G/DL — LOW (ref 11.5–15.5)
HGB BLD-MCNC: 9 G/DL — LOW (ref 11.5–15.5)
IMM GRANULOCYTES NFR BLD AUTO: 0.4 % — SIGNIFICANT CHANGE UP (ref 0–0.9)
LYMPHOCYTES # BLD AUTO: 1.5 K/UL — SIGNIFICANT CHANGE UP (ref 1–3.3)
LYMPHOCYTES # BLD AUTO: 12.4 % — LOW (ref 13–44)
MCHC RBC-ENTMCNC: 25.1 PG — LOW (ref 27–34)
MCHC RBC-ENTMCNC: 25.3 PG — LOW (ref 27–34)
MCHC RBC-ENTMCNC: 31.9 GM/DL — LOW (ref 32–36)
MCHC RBC-ENTMCNC: 32 GM/DL — SIGNIFICANT CHANGE UP (ref 32–36)
MCV RBC AUTO: 78.8 FL — LOW (ref 80–100)
MCV RBC AUTO: 78.9 FL — LOW (ref 80–100)
MONOCYTES # BLD AUTO: 0.91 K/UL — HIGH (ref 0–0.9)
MONOCYTES NFR BLD AUTO: 7.5 % — SIGNIFICANT CHANGE UP (ref 2–14)
NEUTROPHILS # BLD AUTO: 9.59 K/UL — HIGH (ref 1.8–7.4)
NEUTROPHILS NFR BLD AUTO: 79.4 % — HIGH (ref 43–77)
PLATELET # BLD AUTO: 191 K/UL — SIGNIFICANT CHANGE UP (ref 150–400)
PLATELET # BLD AUTO: 200 K/UL — SIGNIFICANT CHANGE UP (ref 150–400)
RBC # BLD: 3.56 M/UL — LOW (ref 3.8–5.2)
RBC # BLD: 3.58 M/UL — LOW (ref 3.8–5.2)
RBC # FLD: 15.9 % — HIGH (ref 10.3–14.5)
RBC # FLD: 16.6 % — HIGH (ref 10.3–14.5)
WBC # BLD: 10.6 K/UL — HIGH (ref 3.8–10.5)
WBC # BLD: 12.08 K/UL — HIGH (ref 3.8–10.5)
WBC # FLD AUTO: 10.6 K/UL — HIGH (ref 3.8–10.5)
WBC # FLD AUTO: 12.08 K/UL — HIGH (ref 3.8–10.5)

## 2023-07-27 RX ORDER — IBUPROFEN 200 MG
600 TABLET ORAL EVERY 6 HOURS
Refills: 0 | Status: DISCONTINUED | OUTPATIENT
Start: 2023-07-27 | End: 2023-07-28

## 2023-07-27 RX ORDER — SODIUM CHLORIDE 9 MG/ML
500 INJECTION, SOLUTION INTRAVENOUS ONCE
Refills: 0 | Status: COMPLETED | OUTPATIENT
Start: 2023-07-27 | End: 2023-07-27

## 2023-07-27 RX ORDER — IBUPROFEN 200 MG
1 TABLET ORAL
Qty: 20 | Refills: 0
Start: 2023-07-27 | End: 2023-07-31

## 2023-07-27 RX ORDER — ACETAMINOPHEN 500 MG
3 TABLET ORAL
Qty: 60 | Refills: 0
Start: 2023-07-27 | End: 2023-07-31

## 2023-07-27 RX ADMIN — Medication 600 MILLIGRAM(S): at 23:30

## 2023-07-27 RX ADMIN — Medication 975 MILLIGRAM(S): at 09:04

## 2023-07-27 RX ADMIN — Medication 600 MILLIGRAM(S): at 18:03

## 2023-07-27 RX ADMIN — Medication 30 MILLIGRAM(S): at 05:02

## 2023-07-27 RX ADMIN — Medication 975 MILLIGRAM(S): at 21:08

## 2023-07-27 RX ADMIN — SCOPALAMINE 1 PATCH: 1 PATCH, EXTENDED RELEASE TRANSDERMAL at 20:00

## 2023-07-27 RX ADMIN — ENOXAPARIN SODIUM 40 MILLIGRAM(S): 100 INJECTION SUBCUTANEOUS at 23:30

## 2023-07-27 RX ADMIN — Medication 30 MILLIGRAM(S): at 12:31

## 2023-07-27 RX ADMIN — Medication 975 MILLIGRAM(S): at 03:30

## 2023-07-27 RX ADMIN — SODIUM CHLORIDE 500 MILLILITER(S): 9 INJECTION, SOLUTION INTRAVENOUS at 00:14

## 2023-07-27 NOTE — PROGRESS NOTE ADULT - ASSESSMENT
ASSESSMENT:  RAQUEL REYESLOPEZ is a 33y yo  POD1 s/p rCS and BS, c/b stable OB hemorrhage, hx GDMA1   Patient has no complaints at this time, is otherwise clinically and hemodynamically stable.  [place] boy is at bedside. Patient reports she does not desire circumcision. Baby noted to be [place] positive    #Chronic Anemia with Acute Blood Loss  - QBL 1044cc; pt received TXA pre-op  - Hgb 9.7 > 1u pRBC > 10.5 > AM CBC pending; rising appropriately  - asymptomatic at this time    #GDMA1  - Asymptomatic at this time    #CS Postpartum   - Continue routine post-operative and post-partum care  - Rub: I/I  - TOV successful  - Continue with current pain management  - Encouraged maternal- bonding  - Encouraged ambulation and use of incentive spirometer    - Diet: Regular, advance as tolerated  - DVT ppx: SCDs and subQ Lovenox  - Dispo: Home POD2 per Attending's approval

## 2023-07-27 NOTE — PROGRESS NOTE ADULT - SUBJECTIVE AND OBJECTIVE BOX
INTERVAL HPI/OVERNIGHT EVENTS:  33y Female s/p c section under spinal anesthesia with duramorph for post op analgesia on 07/26/23    Vital Signs Last 24 Hrs  T(C): 36.7 (27 Jul 2023 04:48), Max: 36.9 (26 Jul 2023 08:49)  T(F): 98 (27 Jul 2023 04:48), Max: 98.5 (26 Jul 2023 08:49)  HR: 68 (27 Jul 2023 04:48) (58 - 84)  BP: 104/67 (27 Jul 2023 04:48) (83/52 - 109/63)  BP(mean): --  RR: 18 (27 Jul 2023 04:48) (14 - 22)  SpO2: 97% (27 Jul 2023 04:48) (87% - 98%)    Parameters below as of 27 Jul 2023 04:48  Patient On (Oxygen Delivery Method): room air            Patient's overall anesthesia satisfaction: Positive    Patients pain is well controlled with IT duramorph    No respiratory events overnight    No pruritis at this time    Patient doing well     No headache      No residual numbness or weakness, sensory and motor function intact.    No anesthetic complications or complaints noted or reported          .

## 2023-07-27 NOTE — PROGRESS NOTE ADULT - SUBJECTIVE AND OBJECTIVE BOX
SUBJECTIVE:  Patient seen and examined this morning at bedside. No acute events overnight. Reports feeling slightly weak this morning. She reports an episode of vomit yesterday but denies any nausea or vomiting at this time and is able to tolerate food. Pain is well controlled with PRN pain medication. Patient is able to ambulate, is voiding spontaneously, but denies flatus or BM at this time. Denies fevers, chills, shortness of breath, headaches, chest pain, vision changes or calf pain.    OBJECTIVE:  Physical exam:  General: AOx3, NAD.  Heart: S1/S2 with regular rate and normal rhythm.  Lungs: CTABL, no crackles or wheezing.   Abdomen: +BS, Soft, appropriately tender, nondistended, no guarding or rebound tenderness, firm uterine fundus at umbilicus  Incision: clean dry and intact with steristrips. No erythema or discharge.  Ext: BL LE reveal minimal swelling, no popliteal tenderness or skin changes.     Vital Signs Last 24 Hrs  T(C): 36.7 (2023 04:48), Max: 36.9 (2023 08:49)  T(F): 98 (2023 04:48), Max: 98.5 (2023 08:49)  HR: 68 (2023 04:48) (58 - 84)  BP: 104/67 (2023 04:48) (83/52 - 109/63)      23 @ 07:01  -  23 @ 05:35  --------------------------------------------------------  IN: 3000 mL / OUT: 2386 mL / NET: 614 mL        LABS:                        10.5   18.51 )-----------( 207      ( 2023 19:40 )             32.2     Urinalysis Basic - ( 2023 09:25 )    Color: Yellow / Appearance: Slightly Turbid / S.010 / pH: x  Gluc: 73 mg/dL / Ketone: Trace  / Bili: Negative / Urobili: Negative mg/dL   Blood: x / Protein: 15 / Nitrite: Negative   Leuk Esterase: Small / RBC: 3-5 /HPF / WBC 11-25 /HPF   Sq Epi: x / Non Sq Epi: x / Bacteria: Moderate

## 2023-07-28 VITALS
TEMPERATURE: 98 F | HEART RATE: 66 BPM | RESPIRATION RATE: 17 BRPM | SYSTOLIC BLOOD PRESSURE: 108 MMHG | DIASTOLIC BLOOD PRESSURE: 69 MMHG | OXYGEN SATURATION: 98 %

## 2023-07-28 PROCEDURE — 85384 FIBRINOGEN ACTIVITY: CPT

## 2023-07-28 PROCEDURE — 82570 ASSAY OF URINE CREATININE: CPT

## 2023-07-28 PROCEDURE — 84156 ASSAY OF PROTEIN URINE: CPT

## 2023-07-28 PROCEDURE — 59050 FETAL MONITOR W/REPORT: CPT

## 2023-07-28 PROCEDURE — 81001 URINALYSIS AUTO W/SCOPE: CPT

## 2023-07-28 PROCEDURE — 36415 COLL VENOUS BLD VENIPUNCTURE: CPT

## 2023-07-28 PROCEDURE — 83615 LACTATE (LD) (LDH) ENZYME: CPT

## 2023-07-28 PROCEDURE — 85025 COMPLETE CBC W/AUTO DIFF WBC: CPT

## 2023-07-28 PROCEDURE — 80053 COMPREHEN METABOLIC PANEL: CPT

## 2023-07-28 PROCEDURE — 82962 GLUCOSE BLOOD TEST: CPT

## 2023-07-28 PROCEDURE — C1889: CPT

## 2023-07-28 PROCEDURE — P9016: CPT

## 2023-07-28 PROCEDURE — 86769 SARS-COV-2 COVID-19 ANTIBODY: CPT

## 2023-07-28 PROCEDURE — 85027 COMPLETE CBC AUTOMATED: CPT

## 2023-07-28 PROCEDURE — 85610 PROTHROMBIN TIME: CPT

## 2023-07-28 PROCEDURE — 86780 TREPONEMA PALLIDUM: CPT

## 2023-07-28 PROCEDURE — 88302 TISSUE EXAM BY PATHOLOGIST: CPT

## 2023-07-28 PROCEDURE — 85730 THROMBOPLASTIN TIME PARTIAL: CPT

## 2023-07-28 PROCEDURE — 84550 ASSAY OF BLOOD/URIC ACID: CPT

## 2023-07-28 PROCEDURE — 36430 TRANSFUSION BLD/BLD COMPNT: CPT

## 2023-07-28 RX ORDER — OXYCODONE HYDROCHLORIDE 5 MG/1
1 TABLET ORAL
Qty: 5 | Refills: 0
Start: 2023-07-28 | End: 2023-08-01

## 2023-07-28 RX ADMIN — Medication 975 MILLIGRAM(S): at 02:10

## 2023-07-28 RX ADMIN — Medication 600 MILLIGRAM(S): at 05:23

## 2023-07-28 RX ADMIN — Medication 975 MILLIGRAM(S): at 09:49

## 2023-07-28 RX ADMIN — Medication 600 MILLIGRAM(S): at 15:53

## 2023-07-28 NOTE — PROGRESS NOTE ADULT - SUBJECTIVE AND OBJECTIVE BOX
SUBJECTIVE:  Patient seen and examined this morning at bedside. No acute events overnight. Reports feeling slightly weak this morning. She reports an episode of vomit yesterday but denies any nausea or vomiting at this time and is able to tolerate food. Pain is well controlled with PRN pain medication. Patient is able to ambulate, is voiding spontaneously, but denies flatus or BM at this time. Denies fevers, chills, shortness of breath, headaches, chest pain, vision changes or calf pain.    OBJECTIVE:  Physical exam:  General: AOx3, NAD.  Heart: S1/S2 with regular rate and normal rhythm.  Lungs: CTABL, no crackles or wheezing.   Abdomen: +BS, Soft, appropriately tender, nondistended, no guarding or rebound tenderness, firm uterine fundus at umbilicus  Incision: clean dry and intact with steristrips. No erythema or discharge.  Ext: BL LE reveal minimal swelling, no popliteal tenderness or skin changes.     Vital Signs Last 24 Hrs  T(C): 36.9 (2023 15:37), Max: 36.9 (2023 15:37)  T(F): 98.4 (2023 15:37), Max: 98.4 (2023 15:37)  HR: 68 (2023 15:37) (68 - 68)  BP: 106/67 (2023 15:37) (106/67 - 106/67)      23 @ 07:01  -  23 @ 05:35  --------------------------------------------------------  IN: 3000 mL / OUT: 2386 mL / NET: 614 mL        LABS:                        10.5   18.51 )-----------( 207      ( 2023 19:40 )             32.2     Urinalysis Basic - ( 2023 09:25 )    Color: Yellow / Appearance: Slightly Turbid / S.010 / pH: x  Gluc: 73 mg/dL / Ketone: Trace  / Bili: Negative / Urobili: Negative mg/dL   Blood: x / Protein: 15 / Nitrite: Negative   Leuk Esterase: Small / RBC: 3-5 /HPF / WBC 11-25 /HPF   Sq Epi: x / Non Sq Epi: x / Bacteria: Moderate           SUBJECTIVE:  Patient seen and examined this morning at bedside. No acute events overnight. Reports feeling slightly weak this morning. She reports an episode of vomit yesterday but denies any nausea or vomiting at this time and is able to tolerate food. Pain is well controlled with PRN pain medication. Patient is able to ambulate, is voiding spontaneously, but has passsed flatus and BM. Denies fevers, chills, shortness of breath, headaches, chest pain, vision changes or calf pain.    OBJECTIVE:  Physical exam:  General: AOx3, NAD.  Heart: S1/S2 with regular rate and normal rhythm.  Lungs: CTABL, no crackles or wheezing.   Abdomen: +BS, Soft, appropriately tender, nondistended, no guarding or rebound tenderness, firm uterine fundus at umbilicus  Incision: clean dry and intact with steristrips. No erythema or discharge.  Ext: BL LE reveal minimal swelling, no popliteal tenderness or skin changes.     Vital Signs Last 24 Hrs  T(C): 36.9 (2023 15:37), Max: 36.9 (2023 15:37)  T(F): 98.4 (2023 15:37), Max: 98.4 (2023 15:37)  HR: 68 (2023 15:37) (68 - 68)  BP: 106/67 (2023 15:37) (106/67 - 106/67)      23 @ 07:01  -  23 @ 05:35  --------------------------------------------------------  IN: 3000 mL / OUT: 2386 mL / NET: 614 mL        LABS:                        10.5   18.51 )-----------( 207      ( 2023 19:40 )             32.2     Urinalysis Basic - ( 2023 09:25 )    Color: Yellow / Appearance: Slightly Turbid / S.010 / pH: x  Gluc: 73 mg/dL / Ketone: Trace  / Bili: Negative / Urobili: Negative mg/dL   Blood: x / Protein: 15 / Nitrite: Negative   Leuk Esterase: Small / RBC: 3-5 /HPF / WBC 11-25 /HPF   Sq Epi: x / Non Sq Epi: x / Bacteria: Moderate           SUBJECTIVE:  Patient seen and examined this morning at bedside. No acute events overnight. Reports feeling slightly weak this morning.Denies any nausea or vomiting at this time and is able to tolerate food. Pain is well controlled with PRN pain medication. Patient is able to ambulate, is voiding spontaneously, has passed flatus but no BM at this time. Denies fevers, chills, shortness of breath, headaches, chest pain, vision changes or calf pain.    OBJECTIVE:  Physical exam:  General: AOx3, NAD.  Heart: S1/S2 with regular rate and normal rhythm.  Lungs: CTABL, no crackles or wheezing.   Abdomen: +BS, Soft, appropriately tender, nondistended, no guarding or rebound tenderness, firm uterine fundus at umbilicus  Incision: clean dry and intact with steristrips. No erythema or discharge.  Ext: BL LE reveal minimal swelling, no popliteal tenderness or skin changes.     Vital Signs Last 24 Hrs  T(C): 36.9 (2023 15:37), Max: 36.9 (2023 15:37)  T(F): 98.4 (2023 15:37), Max: 98.4 (2023 15:37)  HR: 68 (2023 15:37) (68 - 68)  BP: 106/67 (2023 15:37) (106/67 - 106/67)      23 @ 07:01  -  23 @ 05:35  --------------------------------------------------------  IN: 3000 mL / OUT: 2386 mL / NET: 614 mL    LABS:                        10.5   18.51 )-----------( 207      ( 2023 19:40 )             32.2     Urinalysis Basic - ( 2023 09:25 )    Color: Yellow / Appearance: Slightly Turbid / S.010 / pH: x  Gluc: 73 mg/dL / Ketone: Trace  / Bili: Negative / Urobili: Negative mg/dL   Blood: x / Protein: 15 / Nitrite: Negative   Leuk Esterase: Small / RBC: 3-5 /HPF / WBC 11-25 /HPF   Sq Epi: x / Non Sq Epi: x / Bacteria: Moderate

## 2023-07-28 NOTE — PROGRESS NOTE ADULT - ATTENDING COMMENTS
POD1  s/p R c sec with jewel salpingectomy   hx of Hemorrhage and required one Unit blood transfusion   patient is complaining of dizziness  vitals stable   abd soft, ND, incision intact   Hb 8.5  as patient is dizzy , repeat CBC again to assess the need for more blood transfuion   DVT ppx by lovenox
post repeat  / BS / EARNESTINE / Stable OB hemorrhage due to atony s/p 1 unit - POST Op day # 2  no complaints  exam wnl  labs reviewed    cleared for dc  discussed  vaccination, breastfeeding  follow up for post partum visit

## 2023-07-28 NOTE — PROGRESS NOTE ADULT - ASSESSMENT
ASSESSMENT:  RAQUEL REYESLOPEZ is a 33y yo  POD2 s/p rCS and BS, lysis of dense adhesions, c/b stable OB hemorrhage, hx GDMA1   Patient has no complaints at this time, is otherwise clinically and hemodynamically stable.  [place] boy is at bedside. Patient reports she does not desire circumcision. Baby noted to be Garland positive    #Chronic Anemia with Acute Blood Loss  - QBL 1044cc; pt received TXA pre-op  - Hgb 9.7 > 1u pRBC > 10.5 > 9 >9 lateralized  -     #GDMA1  - Asymptomatic at this time, FS wnl    #CS Postpartum   - Continue routine post-operative and post-partum care  - Rub: I/I  - TOV successful  - Continue with current pain management  - Encouraged maternal- bonding  - Encouraged ambulation and use of incentive spirometer    - Diet: Regular, advance as tolerated  - DVT ppx: SCDs and subQ Lovenox  - Dispo: Home today per Attending's approval        ASSESSMENT:  RAQUEL REYESLOPEZ is a 33y yo  POD2 s/p rCS and BS, lysis of dense adhesions, c/b stable OB hemorrhage, hx GDMA1   Patient reported no weakness or lightheadedness this morning. She was concerned about going home and returning to function with her current pain, patient amenable to having some oxycodone prescribed along with Tylenol and Motrin. Patient is otherwise clinically and hemodynamically stable.   [place] boy is at bedside. Patient reports she does not desire circumcision. Baby noted to be Garland positive    #Chronic Anemia with Acute Blood Loss  - QBL 1044cc; pt received TXA pre-op  - Hgb 9.7 > 1u pRBC > 10.5 > 9 >9 lateralized    #GDMA1  - Asymptomatic at this time, FS wnl    #CS Postpartum   - Continue routine post-operative and post-partum care  - Rub: I/I  - TOV successful  - Continue with current pain management  - Encouraged maternal- bonding  - Encouraged ambulation and use of incentive spirometer    - Diet: Regular, advance as tolerated  - DVT ppx: SCDs and subQ Lovenox  - Dispo: Home today per Attending's approval

## 2023-08-16 LAB — SURGICAL PATHOLOGY STUDY: SIGNIFICANT CHANGE UP

## 2024-02-06 ENCOUNTER — OFFICE (OUTPATIENT)
Dept: URBAN - METROPOLITAN AREA CLINIC 6 | Facility: CLINIC | Age: 34
Setting detail: OPHTHALMOLOGY
End: 2024-02-06
Payer: COMMERCIAL

## 2024-02-06 ENCOUNTER — RX ONLY (RX ONLY)
Age: 34
End: 2024-02-06

## 2024-02-06 DIAGNOSIS — H52.7: ICD-10-CM

## 2024-02-06 DIAGNOSIS — H11.041: ICD-10-CM

## 2024-02-06 PROBLEM — H11.152 PINGUECULA; LEFT EYE: Status: ACTIVE | Noted: 2024-02-06

## 2024-02-06 PROCEDURE — 92015 DETERMINE REFRACTIVE STATE: CPT | Performed by: OPHTHALMOLOGY

## 2024-02-06 PROCEDURE — 92004 COMPRE OPH EXAM NEW PT 1/>: CPT | Performed by: OPHTHALMOLOGY

## 2024-02-06 ASSESSMENT — REFRACTION_MANIFEST
OS_AXIS: 000
OD_CYLINDER: -0.25
OS_CYLINDER: SPHERE
OD_AXIS: 090
OS_VA1: 20/20-2
OU_VA: 20/20-1
OD_SPHERE: -1.25
OS_CYLINDER: -0.50
OS_VA1: 20/20-2
OU_VA: 20/20-1
OD_SPHERE: -1.75
OS_SPHERE: -1.75
OD_CYLINDER: -0.75
OD_VA1: 20/25-2
OD_VA1: 20/25-2
OD_AXIS: 090
OS_SPHERE: -1.75

## 2024-02-06 ASSESSMENT — REFRACTION_AUTOREFRACTION
OS_SPHERE: -1.75
OD_SPHERE: -1.25
OS_AXIS: 000
OS_CYLINDER: 0.00
OD_AXIS: 088
OD_CYLINDER: -0.25

## 2024-02-06 ASSESSMENT — CONFRONTATIONAL VISUAL FIELD TEST (CVF)
OD_FINDINGS: FULL
OS_FINDINGS: FULL

## 2024-02-06 ASSESSMENT — CORNEAL PTERYGIUM: OD_PTERYGIUM: NASAL 1MM

## 2024-02-06 ASSESSMENT — SPHEQUIV_DERIVED
OS_SPHEQUIV: -2
OD_SPHEQUIV: -1.375
OD_SPHEQUIV: -1.375
OD_SPHEQUIV: -2.125
OS_SPHEQUIV: -1.75

## 2024-07-03 PROBLEM — R39.15 URINARY URGENCY: Status: ACTIVE | Noted: 2024-07-03

## 2024-07-03 PROBLEM — R35.0 FREQUENT URINATION: Status: ACTIVE | Noted: 2024-07-03

## 2024-07-08 ENCOUNTER — APPOINTMENT (OUTPATIENT)
Dept: UROGYNECOLOGY | Facility: CLINIC | Age: 34
End: 2024-07-08
Payer: MEDICAID

## 2024-07-08 VITALS
SYSTOLIC BLOOD PRESSURE: 112 MMHG | BODY MASS INDEX: 37.9 KG/M2 | DIASTOLIC BLOOD PRESSURE: 75 MMHG | WEIGHT: 188 LBS | HEIGHT: 59 IN

## 2024-07-08 DIAGNOSIS — R10.2 PELVIC AND PERINEAL PAIN: ICD-10-CM

## 2024-07-08 DIAGNOSIS — R39.15 URGENCY OF URINATION: ICD-10-CM

## 2024-07-08 DIAGNOSIS — Z78.9 OTHER SPECIFIED HEALTH STATUS: ICD-10-CM

## 2024-07-08 DIAGNOSIS — R35.0 FREQUENCY OF MICTURITION: ICD-10-CM

## 2024-07-08 DIAGNOSIS — N39.0 URINARY TRACT INFECTION, SITE NOT SPECIFIED: ICD-10-CM

## 2024-07-08 LAB
BILIRUB UR QL STRIP: NEGATIVE
CLARITY UR: CLEAR
COLLECTION METHOD: NORMAL
HCG UR QL: 0.2
HGB UR QL STRIP.AUTO: NEGATIVE
KETONES UR-MCNC: NEGATIVE
LEUKOCYTE ESTERASE UR QL STRIP: NEGATIVE
NITRITE UR QL STRIP: NEGATIVE
PH UR STRIP: 5.5
PROT UR STRIP-MCNC: NEGATIVE
SP GR UR STRIP: 1.02

## 2024-07-08 PROCEDURE — 81003 URINALYSIS AUTO W/O SCOPE: CPT | Mod: QW

## 2024-07-08 PROCEDURE — T1013A: CUSTOM

## 2024-07-08 PROCEDURE — 99204 OFFICE O/P NEW MOD 45 MIN: CPT | Mod: 25

## 2024-07-08 PROCEDURE — 51701 INSERT BLADDER CATHETER: CPT | Mod: 59

## 2024-07-08 PROCEDURE — 99459 PELVIC EXAMINATION: CPT

## 2024-08-19 ENCOUNTER — APPOINTMENT (OUTPATIENT)
Dept: UROGYNECOLOGY | Facility: CLINIC | Age: 34
End: 2024-08-19

## 2025-03-17 NOTE — OB RN PATIENT PROFILE - BIRTH SEX
Patient here for follow up. Denies pain or SOB. Eating and sleeping well. Ambulating with steady gait. Denies headaches or dizziness. ROS as noted below. No other questions or concerns.     Treatment Site: Vaginal Cuff  Completion Date: 02/14/25  GyneOncologist: combo with Dr. Portillo today  Imaging: none    Review of Systems   Constitutional: Negative.    HENT:  Negative.     Eyes: Negative.    Respiratory:  Negative for cough and shortness of breath.    Cardiovascular:  Negative for chest pain and leg swelling.   Gastrointestinal:  Negative for abdominal pain, blood in stool, constipation, nausea and vomiting.        Left lateral (very occasional) discomfort rated 1-2 out of 10.    Genitourinary:  Negative for difficulty urinating, dysuria, hematuria, pelvic pain, vaginal bleeding and vaginal discharge.         Patient is not sexually active and not currently using vaginal dilator.    Musculoskeletal: Negative.    Skin: Negative.    Neurological: Negative.    Hematological: Negative.         Female

## 2025-07-21 ENCOUNTER — EMERGENCY (EMERGENCY)
Facility: HOSPITAL | Age: 35
LOS: 1 days | End: 2025-07-21
Attending: STUDENT IN AN ORGANIZED HEALTH CARE EDUCATION/TRAINING PROGRAM
Payer: MEDICAID

## 2025-07-21 VITALS
RESPIRATION RATE: 18 BRPM | TEMPERATURE: 98 F | OXYGEN SATURATION: 99 % | DIASTOLIC BLOOD PRESSURE: 81 MMHG | HEART RATE: 70 BPM | SYSTOLIC BLOOD PRESSURE: 145 MMHG | WEIGHT: 187.17 LBS

## 2025-07-21 DIAGNOSIS — Z98.890 OTHER SPECIFIED POSTPROCEDURAL STATES: Chronic | ICD-10-CM

## 2025-07-21 DIAGNOSIS — Z98.891 HISTORY OF UTERINE SCAR FROM PREVIOUS SURGERY: Chronic | ICD-10-CM

## 2025-07-21 LAB — HCG UR QL: NEGATIVE — SIGNIFICANT CHANGE UP

## 2025-07-21 PROCEDURE — 99283 EMERGENCY DEPT VISIT LOW MDM: CPT | Mod: 25

## 2025-07-21 PROCEDURE — 10160 PNXR ASPIR ABSC HMTMA BULLA: CPT

## 2025-07-21 PROCEDURE — 99284 EMERGENCY DEPT VISIT MOD MDM: CPT

## 2025-07-21 PROCEDURE — 87070 CULTURE OTHR SPECIMN AEROBIC: CPT

## 2025-07-21 PROCEDURE — T1013: CPT

## 2025-07-21 PROCEDURE — 81025 URINE PREGNANCY TEST: CPT

## 2025-07-21 PROCEDURE — 87077 CULTURE AEROBIC IDENTIFY: CPT

## 2025-07-21 RX ORDER — MUPIROCIN CALCIUM 20 MG/G
1 CREAM TOPICAL ONCE
Refills: 0 | Status: COMPLETED | OUTPATIENT
Start: 2025-07-21 | End: 2025-07-21

## 2025-07-21 RX ORDER — HYDROCORTISONE 10 MG/G
1 CREAM TOPICAL ONCE
Refills: 0 | Status: COMPLETED | OUTPATIENT
Start: 2025-07-21 | End: 2025-07-21

## 2025-07-21 RX ADMIN — HYDROCORTISONE 1 SUPPOSITORY(S): 10 CREAM TOPICAL at 14:33

## 2025-07-21 RX ADMIN — MUPIROCIN CALCIUM 1 APPLICATION(S): 20 CREAM TOPICAL at 14:34

## 2025-07-21 NOTE — ED PROVIDER NOTE - CARE PROVIDER_API CALL
Annabelle Gutierrez)  Colon & Rectal Surgery  [address], [address] [place] [address]  Phone: [phone]  Fax: [phone]  Follow Up Time:

## 2025-07-21 NOTE — ED PROVIDER NOTE - ATTENDING APP SHARED VISIT CONTRIBUTION OF CARE
35-year-old female presents with 1 week of rectal pain.  Patient found to have hemorrhoid.  Patient instructed to follow-up with colorectal surgery stable for DC with follow-up given return precautions

## 2025-07-21 NOTE — ED ADULT NURSE NOTE - OBJECTIVE STATEMENT
Pt presents to the ED A&Ox4 c/o rectal pain x 1 week. Pt denies bleeding. Pt reports pimple to buttock. Pt RR even and unlabored, NAD noted. Pt reports this happened once before.

## 2025-07-21 NOTE — ED PROVIDER NOTE - NS ED ROS FT
No fever/chills, No photophobia/eye pain/changes in vision, No ear pain/sore throat/dysphagia, No chest pain/palpitations, no SOB/cough/wheeze/stridor, No abdominal pain, No N/V/D, no dysuria/frequency/discharge, +rectal pain, No neck/back pain, no rash, no changes in neurological status/function.

## 2025-07-21 NOTE — ED PROVIDER NOTE - CLINICAL SUMMARY MEDICAL DECISION MAKING FREE TEXT BOX
thrombosed external hemmorhoid  pilonidal pimple  needle aspiration  refer to colarectal  treat with annusol  increase fiber  stool softner  refer to lynnectal

## 2025-07-21 NOTE — ED PROVIDER NOTE - PROGRESS NOTE DETAILS
needle aspiration to pimple to upper buttock pilonidal region, wound culture obtained spoke to colorectal surgery, no indication to make elliptical incision for hemorrhoid being that symptoms > 72 hours

## 2025-07-21 NOTE — ED PROVIDER NOTE - OBJECTIVE STATEMENT
This is a 35 year old female here for rectal pain x 1 week.  She reports went to [facility] clinic and was told to come to ED for evaluation of hemmorhoid and possible pilonidal infection.  She reports 7 years ago was evaluated at [facility] for similar had I&D.  She notes hemorrhoid pain started before developed pimple to buttock.  She denies any fevers, or chills, or h/o DM.  She denies any h/o constipation and does not strain on toliet.

## 2025-07-21 NOTE — ED PROVIDER NOTE - IV ALTEPLASE INCLUSION HIDDEN
1. We have options to treat pain from the arthritis in you facet joints, return to me if needed    2. See Dr Chen today for surgical opinion    3. Continue gabapentin 300mg twice daily  
show

## 2025-07-21 NOTE — ED PROVIDER NOTE - PHYSICAL EXAMINATION
Constitutional - well-developed; well nourished. Head - NCAT. Airway patent. Eyes - PERRL. CV - RRR. no murmur. no edema. Pulm - CTAB. Abd - soft, nt. no rebound. no guarding. Neuro - A&Ox3. strength 5/5 x4. sensation intact x4. normal gait. Rectal + thrombosed external hemorrhoid, +pimple to pilonidal region, Skin - No rash. MSK - normal ROM.

## 2025-07-21 NOTE — ED PROVIDER NOTE - PATIENT PORTAL LINK FT
You can access the FollowMyHealth Patient Portal offered by [facility] by registering at the following website: [email or web address]. By joining [email or web address] [email or web address] portal, you will also be able to view your health information using other applications (apps) compatible with our system.

## 2025-07-24 LAB
CULTURE RESULTS: SIGNIFICANT CHANGE UP
SPECIMEN SOURCE: SIGNIFICANT CHANGE UP